# Patient Record
Sex: MALE | Race: BLACK OR AFRICAN AMERICAN | Employment: UNEMPLOYED | URBAN - METROPOLITAN AREA
[De-identification: names, ages, dates, MRNs, and addresses within clinical notes are randomized per-mention and may not be internally consistent; named-entity substitution may affect disease eponyms.]

---
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
## 2020-04-10 ENCOUNTER — HOSPITAL ENCOUNTER (EMERGENCY)
Age: 20
Discharge: HOME | End: 2020-04-10
Attending: EMERGENCY MEDICINE
Payer: SUBSIDIZED

## 2020-04-10 VITALS
HEIGHT: 70 IN | WEIGHT: 278.22 LBS | TEMPERATURE: 98.7 F | RESPIRATION RATE: 18 BRPM | BODY MASS INDEX: 39.83 KG/M2 | DIASTOLIC BLOOD PRESSURE: 95 MMHG | HEART RATE: 81 BPM | OXYGEN SATURATION: 99 % | SYSTOLIC BLOOD PRESSURE: 136 MMHG

## 2020-04-10 DIAGNOSIS — H10.9 BACTERIAL CONJUNCTIVITIS OF RIGHT EYE: Primary | ICD-10-CM

## 2020-04-10 PROCEDURE — 99282 EMERGENCY DEPT VISIT SF MDM: CPT

## 2020-04-10 RX ORDER — CIPROFLOXACIN HYDROCHLORIDE 3.5 MG/ML
1 SOLUTION/ DROPS TOPICAL 2 TIMES DAILY
Qty: 2.5 ML | Refills: 0 | Status: SHIPPED | OUTPATIENT
Start: 2020-04-10 | End: 2020-04-20

## 2020-04-10 NOTE — DISCHARGE INSTRUCTIONS

## 2020-04-10 NOTE — ED PROVIDER NOTES
EMERGENCY DEPARTMENT HISTORY AND PHYSICAL EXAM      Date: 4/10/2020  Patient Name: Umm Ny    History of Presenting Illness     Chief Complaint   Patient presents with   [address]     Patient reports symptoms onset 3 days. History Provided By: Patient    HPI: Umm Ny, 21 y.o. male, presents by POV to the ED with cc of right eye pain, swelling and redness. Patient states he put on false eyelashes and thinks the eyelashes were dirty and caused his eye to become painful and reddened. Patient states he is awakened for the past 3 days with yellowish-greenish eye drainage. Also complains of photophobia, but denies blurred vision or visual changes. There are no other complaints, changes, or physical findings at this time. PCP: None    No current facility-administered medications on file prior to encounter. Current Outpatient Medications on File Prior to Encounter   Medication Sig Dispense Refill    ibuprofen (MOTRIN) 800 mg tablet Take 1 Tab by mouth every eight (8) hours as needed for Pain. 30 Tab 0    butalbital-acetaminophen-caff (FIORICET) -40 mg per capsule Take 1-2 Caps by mouth every four (4) hours as needed for Pain. Max Daily Amount: 12 Caps. Indications: Migraine 20 Cap 0    ondansetron (ZOFRAN ODT) 4 mg disintegrating tablet Take 1 Tab by mouth every eight (8) hours as needed for Nausea. 10 Tab 0       Past History     Past Medical History:  History reviewed. No pertinent past medical history. Past Surgical History:  History reviewed. No pertinent surgical history. Family History:  Family History   Problem Relation Age of Onset    Hypertension Mother     Diabetes Mother     No Known Problems Father        Social History:  Social History     Tobacco Use    Smoking status: Never Smoker    Smokeless tobacco: Never Used   Substance Use Topics    Alcohol use: No    Drug use: No       Allergies:   Allergies   Allergen Reactions    Other Food Hives Mushrooms           Review of Systems   Review of Systems   Constitutional: Negative for activity change, chills and fever. HENT: Negative for congestion, rhinorrhea and sore throat. Eyes: Positive for photophobia, pain, discharge and redness. Negative for itching and visual disturbance. Respiratory: Negative for cough and shortness of breath. Cardiovascular: Negative for chest pain. Gastrointestinal: Negative for abdominal pain, constipation, diarrhea, nausea and vomiting. Endocrine: Negative for polyuria. Genitourinary: Negative for dysuria and frequency. Musculoskeletal: Negative for back pain. Skin: Negative for wound. Neurological: Negative for dizziness and headaches. All other systems reviewed and are negative. Physical Exam   Physical Exam  Vitals signs and nursing note reviewed. Constitutional:       General: He is not in acute distress. Appearance: He is well-developed. He is not diaphoretic. Comments: 21 y.o. male   HENT:      Head: Normocephalic and atraumatic. Nose: Nose normal.      Mouth/Throat:      Mouth: Mucous membranes are moist.      Pharynx: Oropharynx is clear. Eyes:      General: Lids are normal. Vision grossly intact. Gaze aligned appropriately. No visual field deficit. Right eye: Discharge present. No hordeolum. Left eye: No discharge or hordeolum. Extraocular Movements: Extraocular movements intact. Conjunctiva/sclera:      Right eye: Right conjunctiva is injected. No chemosis, exudate or hemorrhage. Left eye: Left conjunctiva is not injected. No chemosis, exudate or hemorrhage. Pupils: Pupils are equal, round, and reactive to light. Neck:      Musculoskeletal: Normal range of motion. Cardiovascular:      Rate and Rhythm: Normal rate and regular rhythm. Heart sounds: Normal heart sounds. No murmur. Pulmonary:      Effort: Pulmonary effort is normal. No respiratory distress.       Breath sounds: Normal breath sounds. No wheezing. Skin:     General: Skin is warm and dry. Neurological:      Mental Status: He is alert and oriented to person, place, and time. Psychiatric:         Behavior: Behavior normal.         Diagnostic Study Results     Labs -   No results found for this or any previous visit (from the past 12 hour(s)). Radiologic Studies -   No orders to display       Medical Decision Making   I am the first provider for this patient. I reviewed the vital signs, available nursing notes, past medical history, past surgical history, family history and social history. Vital Signs-Reviewed the patient's vital signs. Patient Vitals for the past 12 hrs:   Temp Pulse Resp BP SpO2   04/10/20 1547 98.7 °F (37.1 °C) 81 18 (!) 136/95 99 %       Records Reviewed: Nursing Notes, Old Medical Records, Previous Radiology Studies and Previous Laboratory Studies    Provider Notes (Medical Decision Making):   Differential diagnosis include foreign body of the right eye, viral conjunctivitis, bacterial conjunctivitis, allergic rhinitis. ED Course:   Initial assessment performed. The patients presenting problems have been discussed, and they are in agreement with the care plan formulated and outlined with them. I have encouraged them to ask questions as they arise throughout their visit. Patient was given a prescription for ciprofloxacin ophthalmic solution and advised to put 1 drop in both eyes 2 times a day for the next 10 days and to follow-up with ophthalmology or his primary care provider. Advised him if symptoms worsen to come back to the emergency department. Worsening symptoms may include but not limited to increased swelling, drainage, increased pain, visual deficit changes, visual disturbances. Critical Care Time: None    Disposition:  DISCHARGE NOTE:  4:15 PM  The pt is ready for discharge.  The pt's signs, symptoms, diagnosis, and discharge instructions have been discussed and pt has conveyed their understanding. The pt is to follow up as recommended or return to ER should their symptoms worsen. Plan has been discussed and pt is in agreement. Kevin Gonzalez NP  4/10/2020      PLAN:  1. Current Discharge Medication List      START taking these medications    Details   ciprofloxacin HCl (CILOXAN) 0.3 % ophthalmic solution Administer 1 Drop to both eyes two (2) times a day for 10 days. Qty: 2.5 mL, Refills: 0         CONTINUE these medications which have NOT CHANGED    Details   ibuprofen (MOTRIN) 800 mg tablet Take 1 Tab by mouth every eight (8) hours as needed for Pain. Qty: 30 Tab, Refills: 0      butalbital-acetaminophen-caff (FIORICET) -40 mg per capsule Take 1-2 Caps by mouth every four (4) hours as needed for Pain. Max Daily Amount: 12 Caps. Indications: Migraine  Qty: 20 Cap, Refills: 0    Associated Diagnoses: Acute nonintractable headache, unspecified headache type      ondansetron (ZOFRAN ODT) 4 mg disintegrating tablet Take 1 Tab by mouth every eight (8) hours as needed for Nausea. Qty: 10 Tab, Refills: 0           2. Follow-up Information     Follow up With Specialties Details Why Contact Info    [facility] EMERGENCY DEPT Emergency Medicine Go in 1 week As needed, If symptoms worsen [address] [place] [address]    Chavez , MD Ophthalmology Schedule an appointment as soon as possible for a visit in 3 days As needed, If symptoms worsen Janie Negron 11 [phone]          Return to ED if worse     Diagnosis     Clinical Impression:   1. Bacterial conjunctivitis of right eye          Please note that this dictation was completed with [employer], the [employer] voice recognition software. Quite often unanticipated grammatical, syntax, homophones, and other interpretive errors are inadvertently transcribed by the computer software. Please disregards these errors.  Please excuse any errors that have escaped final proofreading. This note will not be viewable in [address]

## 2020-10-11 ENCOUNTER — HOSPITAL ENCOUNTER (EMERGENCY)
Age: 20
Discharge: HOME | End: 2020-10-11
Attending: EMERGENCY MEDICINE
Payer: COMMERCIAL

## 2020-10-11 ENCOUNTER — APPOINTMENT (OUTPATIENT)
Dept: GENERAL RADIOLOGY | Age: 20
End: 2020-10-11
Attending: NURSE PRACTITIONER
Payer: COMMERCIAL

## 2020-10-11 VITALS
OXYGEN SATURATION: 95 % | HEART RATE: 93 BPM | WEIGHT: 260 LBS | HEIGHT: 68 IN | TEMPERATURE: 98.4 F | RESPIRATION RATE: 18 BRPM | SYSTOLIC BLOOD PRESSURE: 183 MMHG | BODY MASS INDEX: 39.4 KG/M2 | DIASTOLIC BLOOD PRESSURE: 80 MMHG

## 2020-10-11 DIAGNOSIS — M25.512 PAIN IN JOINT OF LEFT SHOULDER: Primary | ICD-10-CM
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
## 2020-10-11 PROCEDURE — 96372 THER/PROPH/DIAG INJ SC/IM: CPT

## 2020-10-11 PROCEDURE — 74011250636 HC RX REV CODE- 250/636: Performed by: NURSE PRACTITIONER

## 2020-10-11 PROCEDURE — 99282 EMERGENCY DEPT VISIT SF MDM: CPT

## 2020-10-11 PROCEDURE — 73030 X-RAY EXAM OF SHOULDER: CPT

## 2020-10-11 RX ORDER — NAPROXEN 500 MG/1
500 TABLET ORAL 2 TIMES DAILY WITH MEALS
Qty: 20 TAB | Refills: 0 | Status: SHIPPED | OUTPATIENT
Start: 2020-10-11 | End: 2020-10-21

## 2020-10-11 RX ORDER — METHOCARBAMOL 500 MG/1
500 TABLET, FILM COATED ORAL 4 TIMES DAILY
Qty: 30 TAB | Refills: 0 | Status: SHIPPED | OUTPATIENT
Start: 2020-10-11

## 2020-10-11 RX ORDER — KETOROLAC TROMETHAMINE 30 MG/ML
60 INJECTION, SOLUTION INTRAMUSCULAR; INTRAVENOUS
Status: COMPLETED | OUTPATIENT
Start: 2020-10-11 | End: 2020-10-11

## 2020-10-11 RX ADMIN — KETOROLAC TROMETHAMINE 60 MG: 30 INJECTION, SOLUTION INTRAMUSCULAR; INTRAVENOUS at 15:40
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
## 2020-10-11 NOTE — DISCHARGE INSTRUCTIONS
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
## 2021-04-17 ENCOUNTER — HOSPITAL ENCOUNTER (EMERGENCY)
Age: 21
Discharge: HOME | End: 2021-04-17
Attending: EMERGENCY MEDICINE
Payer: MEDICAID

## 2021-04-17 VITALS
WEIGHT: 263 LBS | TEMPERATURE: 98.6 F | DIASTOLIC BLOOD PRESSURE: 89 MMHG | BODY MASS INDEX: 39.99 KG/M2 | HEART RATE: 94 BPM | SYSTOLIC BLOOD PRESSURE: 151 MMHG | OXYGEN SATURATION: 100 % | RESPIRATION RATE: 16 BRPM

## 2021-04-17 DIAGNOSIS — R11.0 NAUSEA WITHOUT VOMITING: ICD-10-CM

## 2021-04-17 DIAGNOSIS — B34.9 VIRAL ILLNESS: Primary | ICD-10-CM

## 2021-04-17 PROCEDURE — 99282 EMERGENCY DEPT VISIT SF MDM: CPT

## 2021-04-17 RX ORDER — ONDANSETRON 4 MG/1
4 TABLET, ORALLY DISINTEGRATING ORAL
Qty: 20 TAB | Refills: 0 | Status: SHIPPED | OUTPATIENT
Start: 2021-04-17

## 2021-04-17 RX ORDER — ONDANSETRON 4 MG/1
4 TABLET, ORALLY DISINTEGRATING ORAL
Status: DISCONTINUED | OUTPATIENT
Start: 2021-04-17 | End: 2021-04-17 | Stop reason: HOSPADM

## 2021-04-17 NOTE — LETTER
[facility] EMERGENCY DEPT 
[address] [address] 
[phone] Work/School Note Date: 4/17/2021 To Whom It May concern: 
 
Macy Donahue was seen and treated today in the emergency room by the following provider(s): 
Attending Provider: Patric Marie MD 
Nurse Practitioner: Frederic Elizondo NP. Austen Kapoor may return to work on 4/19/21 Sincerely, Silas Zavala RN

## 2021-04-17 NOTE — LETTER
[address] 
[facility] EMERGENCY DEPT 
[address] [address] 
[phone] Work/School Note Date: 4/17/2021 To Whom It May concern: 
 
Nemesio Price was seen and treated today in the emergency room by the following provider(s): 
Attending Provider: Nilson Garvey MD 
Nurse Practitioner: Debby Morgan NP. Austen Kapoor may return to work on 4/20/2021. Sincerely, Aerial LINETTE

## 2021-04-17 NOTE — ED NOTES
Patient here with c/o letter for school/work. Patient states \"let me cut to point, I'm really I just need a doctor's note so I can go home and rest.\"  Patient states that they had recently been exposed to a coworker who was positive for covid-19. Patient states that their work required them to come to the hospital, however patient states that they had been tested outside the hospital and the results came back negative. Patient states that they have had some nausea and vomiting but states \"I don't drink. But I went to [facility] and I had some drinks. I think it's just alcohol poisoning. \"  Patient denies fevers. Patient denies other diet changes. Emergency Department Nursing Plan of Care       The Nursing Plan of Care is developed from the Nursing assessment and Emergency Department Attending provider initial evaluation. The plan of care may be reviewed in the ED Provider note.     The Plan of Care was developed with the following considerations:   Patient / Family readiness to learn indicated by:verbalized understanding  Persons(s) to be included in education: patient  Barriers to Learning/Limitations:No    Signed     Trinity Easley RN    4/17/2021   6:45 PM

## 2021-04-18 NOTE — ED PROVIDER NOTES
EMERGENCY DEPARTMENT HISTORY AND PHYSICAL EXAM    Date: 4/17/2021  Patient Name: Alanis Mendez    History of Presenting Illness     Chief Complaint   Patient presents with    Letter for School/Work     recent covid exposure, test came back negative, states rarely drinks but drank in [facility] so recent vomiting         History Provided By: Patient    Chief Complaint:cough  Duration:  one week  Timing:  Acute  Quality: clear mucous  Severity: Mild  Modifying Factors: none  Associated Symptoms: nasal congestion      HPI: Alanis Mendez is a 24 y.o. male with a PMH of No significant past medical history who presents with request for note to return to work. Patient states he has had a cough and body aches, nasal congestion and had been exposed to a coworker who tested positive for COVID-19 patient states he had a Covid test yesterday and it was negative. Patient states his upper respiratory symptoms are seasonal allergies. Patient states he has been taking Zyrtec and Benadryl at home. Patient states he is ready to go back to work but needs a note. Patient also states he had some vomiting but had a few drinks. Patient states he is still has nausea and has not been eating much but thinks he is afraid to eat due to the nausea. PCP: None    Current Facility-Administered Medications   Medication Dose Route Frequency Provider Last Rate Last Admin    ondansetron (ZOFRAN ODT) tablet 4 mg  4 mg Oral NOW Chaitanya Pelayo NP         Current Outpatient Medications   Medication Sig Dispense Refill    ondansetron (Zofran ODT) 4 mg disintegrating tablet 1 Tab by SubLINGual route every six (6) hours as needed for Nausea or Vomiting. 20 Tab 0    methocarbamoL (Robaxin) 500 mg tablet Take 1 Tab by mouth four (4) times daily. 30 Tab 0    butalbital-acetaminophen-caff (FIORICET) -40 mg per capsule Take 1-2 Caps by mouth every four (4) hours as needed for Pain. Max Daily Amount: 12 Caps.  Indications: Migraine 20 Cap 0       Past History     Past Medical History:  History reviewed. No pertinent past medical history. Past Surgical History:  History reviewed. No pertinent surgical history. Family History:  Family History   Problem Relation Age of Onset    Hypertension Mother     Diabetes Mother     No Known Problems Father        Social History:  Social History     Tobacco Use    Smoking status: Never Smoker    Smokeless tobacco: Never Used   Substance Use Topics    Alcohol use: Yes    Drug use: No       Allergies: Allergies   Allergen Reactions    Other Food Hives     Mushrooms           Review of Systems   Review of Systems   Constitutional: Negative for chills, fatigue and fever. HENT: Positive for congestion and rhinorrhea. Negative for sore throat. Eyes: Negative for redness. Respiratory: Positive for cough. Negative for chest tightness and wheezing. Cardiovascular: Negative for chest pain. Gastrointestinal: Positive for nausea and vomiting. Negative for abdominal pain. Genitourinary: Negative for dysuria. Musculoskeletal: Negative for arthralgias, back pain, myalgias, neck pain and neck stiffness. Skin: Negative for rash. Neurological: Negative for dizziness, syncope, weakness, light-headedness, numbness and headaches. Hematological: Negative for adenopathy. All other systems reviewed and are negative. Physical Exam     Vitals:    04/17/21 1813   BP: (!) 151/89   Pulse: 94   Resp: 16   Temp: 98.6 °F (37 °C)   SpO2: 100%   Weight: 119.3 kg (263 lb)     Physical Exam  Vitals signs and nursing note reviewed. Constitutional:       Appearance: He is well-developed. HENT:      Head: Normocephalic and atraumatic. Right Ear: External ear normal.      Nose: Congestion present. Mouth/Throat:      Pharynx: Posterior oropharyngeal erythema present. Eyes:      General:         Right eye: No discharge. Left eye: No discharge.       Conjunctiva/sclera: Conjunctivae normal.   Neck:      Musculoskeletal: Normal range of motion and neck supple. Cardiovascular:      Rate and Rhythm: Normal rate and regular rhythm. Heart sounds: Normal heart sounds. Pulmonary:      Effort: Pulmonary effort is normal. No respiratory distress. Breath sounds: Normal breath sounds. No wheezing. Abdominal:      General: Bowel sounds are normal.      Palpations: Abdomen is soft. Tenderness: There is no abdominal tenderness. Musculoskeletal: Normal range of motion. Lymphadenopathy:      Cervical: No cervical adenopathy. Skin:     General: Skin is warm and dry. Neurological:      Mental Status: He is alert and oriented to person, place, and time. Cranial Nerves: No cranial nerve deficit. Psychiatric:         Behavior: Behavior normal.         Thought Content: Thought content normal.         Judgment: Judgment normal.           Diagnostic Study Results     Labs -   No results found for this or any previous visit (from the past 12 hour(s)). Radiologic Studies -   No orders to display     CT Results  (Last 48 hours)    None        CXR Results  (Last 48 hours)    None            Medical Decision Making   I am the first provider for this patient. I reviewed the vital signs, available nursing notes, past medical history, past surgical history, family history and social history. Vital Signs-Reviewed the patient's vital signs. Records Reviewed: Nursing Notes    Provider Notes (Medical Decision Making):             Disposition:  home    DISCHARGE NOTE:       I have discussed with patient their diagnosis, treatment, and follow up plan. The patient agrees to follow up as outlined in discharge paperwork and also to return to the ED with any worsening.  Adrián Nuñez NP        Follow-up Information     Follow up With Specialties Details Why [address] Internal Medicine In 1 week  [place] [id number]  [phone]          Discharge Medication List as of 4/17/2021  7:08 PM      CONTINUE these medications which have CHANGED    Details   ondansetron (Zofran ODT) 4 mg disintegrating tablet 1 Tab by SubLINGual route every six (6) hours as needed for Nausea or Vomiting., Normal, Disp-20 Tab, R-0         CONTINUE these medications which have NOT CHANGED    Details   methocarbamoL (Robaxin) 500 mg tablet Take 1 Tab by mouth four (4) times daily. , Normal, Disp-30 Tab,R-0      butalbital-acetaminophen-caff (FIORICET) -40 mg per capsule Take 1-2 Caps by mouth every four (4) hours as needed for Pain. Max Daily Amount: 12 Caps. Indications: Migraine, Print, Disp-20 Cap, R-0             Procedures:  Procedures    Please note that this dictation was completed with Dragon, computer voice recognition software. Quite often unanticipated grammatical, syntax, homophones, and other interpretive errors are inadvertently transcribed by the computer software. Please disregard these errors. Additionally, please excuse any errors that have escaped final proofreading. Diagnosis     Clinical Impression:   1. Viral illness    2.  Nausea without vomiting

## 2021-12-30 ENCOUNTER — HOSPITAL ENCOUNTER (EMERGENCY)
Age: 21
Discharge: LEFT BEFORE DISCHARGE | End: 2021-12-30

## 2021-12-30 VITALS
DIASTOLIC BLOOD PRESSURE: 99 MMHG | RESPIRATION RATE: 18 BRPM | WEIGHT: 250 LBS | OXYGEN SATURATION: 99 % | HEART RATE: 98 BPM | HEIGHT: 67 IN | TEMPERATURE: 99.1 F | SYSTOLIC BLOOD PRESSURE: 181 MMHG | BODY MASS INDEX: 39.24 KG/M2

## 2022-12-27 ENCOUNTER — HOSPITAL ENCOUNTER (EMERGENCY)
Age: 22
Discharge: HOME | End: 2022-12-27
Attending: EMERGENCY MEDICINE
Payer: MEDICAID

## 2022-12-27 VITALS
HEART RATE: 94 BPM | TEMPERATURE: 98 F | BODY MASS INDEX: 39.24 KG/M2 | WEIGHT: 250 LBS | HEIGHT: 67 IN | OXYGEN SATURATION: 99 % | DIASTOLIC BLOOD PRESSURE: 115 MMHG | SYSTOLIC BLOOD PRESSURE: 159 MMHG | RESPIRATION RATE: 18 BRPM

## 2022-12-27 DIAGNOSIS — Z20.2 STD EXPOSURE: Primary | ICD-10-CM

## 2022-12-27 PROCEDURE — 96372 THER/PROPH/DIAG INJ SC/IM: CPT

## 2022-12-27 PROCEDURE — 99284 EMERGENCY DEPT VISIT MOD MDM: CPT

## 2022-12-27 PROCEDURE — 74011000250 HC RX REV CODE- 250: Performed by: EMERGENCY MEDICINE

## 2022-12-27 PROCEDURE — 87491 CHLMYD TRACH DNA AMP PROBE: CPT

## 2022-12-27 PROCEDURE — 74011250636 HC RX REV CODE- 250/636: Performed by: EMERGENCY MEDICINE

## 2022-12-27 RX ORDER — CEFTRIAXONE 250 MG/8ML
500 INJECTION, POWDER, FOR SOLUTION INTRAMUSCULAR; INTRAVENOUS
Status: DISCONTINUED | OUTPATIENT
Start: 2022-12-27 | End: 2022-12-27

## 2022-12-27 RX ORDER — DOXYCYCLINE HYCLATE 100 MG
100 TABLET ORAL 2 TIMES DAILY
Qty: 14 TABLET | Refills: 0 | Status: SHIPPED | OUTPATIENT
Start: 2022-12-27 | End: 2023-01-03

## 2022-12-27 RX ADMIN — LIDOCAINE HYDROCHLORIDE 500 MG: 10 INJECTION, SOLUTION EPIDURAL; INFILTRATION; INTRACAUDAL; PERINEURAL at 18:31

## 2022-12-27 NOTE — ED NOTES
Pt arrives to the ED AAOX4, requesting to be tested and treated for STD after known exposure. Pt denies any symptoms. Pt states he is a transgender female, transitioning from male to female who identifies as she. Pt has his sex organ assigned at birth. Pt is noted in stable condition, now in ED room with side rail up, bed to lowest position, and call light within reach. Will continue to monitor and wait for ED provider evaluation. Emergency Department Nursing Plan of Care       The Nursing Plan of Care is developed from the Nursing assessment and Emergency Department Attending provider initial evaluation. The plan of care may be reviewed in the ED Provider note.     The Plan of Care was developed with the following considerations:   Patient / Family readiness to learn indicated by:verbalized understanding  Persons(s) to be included in education: patient  Barriers to Learning/Limitations:No    Signed     Maria G Marshall    12/27/2022   6:02 PM
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
## 2022-12-27 NOTE — DISCHARGE INSTRUCTIONS
Your STD tests will result within the next 1 to 2 days. We will call you with a positive test.  Otherwise please obtain my chart and you will be able to see results yourself. Take prescribed doxycycline, complete the full 7-day prescription given your exposure.
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
## 2022-12-28 LAB
C TRACH DNA SPEC QL NAA+PROBE: NEGATIVE
N GONORRHOEA DNA SPEC QL NAA+PROBE: NEGATIVE
SAMPLE TYPE: NORMAL
SERVICE CMNT-IMP: NORMAL
SPECIMEN SOURCE: NORMAL

## 2022-12-30 LAB
C TRACH RRNA NPH QL NAA+PROBE: NEGATIVE
N GONORRHOEA RRNA NPH QL NAA+PROBE: NEGATIVE
SPECIMEN SOURCE: NORMAL

## 2023-05-24 RX ORDER — METHOCARBAMOL 500 MG/1
500 TABLET, FILM COATED ORAL 4 TIMES DAILY
COMMUNITY
Start: 2020-10-11

## 2023-05-24 RX ORDER — ONDANSETRON 4 MG/1
4 TABLET, ORALLY DISINTEGRATING ORAL EVERY 6 HOURS PRN
COMMUNITY
Start: 2021-04-17

## 2023-05-24 RX ORDER — BUTALBITAL, ACETAMINOPHEN AND CAFFEINE 300; 40; 50 MG/1; MG/1; MG/1
1-2 CAPSULE ORAL EVERY 4 HOURS PRN
COMMUNITY
Start: 2017-12-29

## 2024-04-18 ENCOUNTER — HOSPITAL ENCOUNTER (EMERGENCY)
Facility: HOSPITAL | Age: 24
Discharge: HOME | End: 2024-04-18

## 2024-04-18 VITALS
HEIGHT: 68 IN | HEART RATE: 98 BPM | BODY MASS INDEX: 47.74 KG/M2 | DIASTOLIC BLOOD PRESSURE: 124 MMHG | OXYGEN SATURATION: 95 % | WEIGHT: 315 LBS | RESPIRATION RATE: 20 BRPM | TEMPERATURE: 98.7 F | SYSTOLIC BLOOD PRESSURE: 173 MMHG

## 2024-04-18 ASSESSMENT — LIFESTYLE VARIABLES
HOW MANY STANDARD DRINKS CONTAINING ALCOHOL DO YOU HAVE ON A TYPICAL DAY: PATIENT DOES NOT DRINK
HOW OFTEN DO YOU HAVE A DRINK CONTAINING ALCOHOL: NEVER

## 2024-04-18 NOTE — ED TRIAGE NOTES
To ED with c/o body aches, fever & chill, associated with emesis, last time she vomited was yesterday morning.  States has not taken any OTC meds.

## 2024-06-17 ENCOUNTER — HOSPITAL ENCOUNTER (OUTPATIENT)
Facility: HOSPITAL | Age: 24
Discharge: HOME | End: 2024-06-20
Attending: FAMILY MEDICINE

## 2024-06-17 ENCOUNTER — TRANSCRIBE ORDERS (OUTPATIENT)
Facility: HOSPITAL | Age: 24
End: 2024-06-17

## 2024-06-17 DIAGNOSIS — M25.572 LEFT ANKLE PAIN, UNSPECIFIED CHRONICITY: Primary | ICD-10-CM

## 2024-06-17 DIAGNOSIS — M25.572 LEFT ANKLE PAIN, UNSPECIFIED CHRONICITY: ICD-10-CM

## 2024-06-17 PROCEDURE — 73610 X-RAY EXAM OF ANKLE: CPT

## 2025-05-24 ENCOUNTER — HOSPITAL ENCOUNTER (EMERGENCY)
Facility: HOSPITAL | Age: 25
Discharge: HOME | End: 2025-05-25
Attending: STUDENT IN AN ORGANIZED HEALTH CARE EDUCATION/TRAINING PROGRAM

## 2025-05-24 ENCOUNTER — APPOINTMENT (OUTPATIENT)
Facility: HOSPITAL | Age: 25
End: 2025-05-24

## 2025-05-24 DIAGNOSIS — R07.9 CHEST PAIN, UNSPECIFIED TYPE: ICD-10-CM

## 2025-05-24 DIAGNOSIS — I16.0 HYPERTENSIVE URGENCY: Primary | ICD-10-CM

## 2025-05-24 DIAGNOSIS — G44.89 OTHER HEADACHE SYNDROME: ICD-10-CM

## 2025-05-24 LAB
BASOPHILS # BLD: 0.04 K/UL (ref 0–0.1)
BASOPHILS NFR BLD: 0.5 % (ref 0–1)
DIFFERENTIAL METHOD BLD: NORMAL
EOSINOPHIL # BLD: 0.09 K/UL (ref 0–0.4)
EOSINOPHIL NFR BLD: 1.1 % (ref 0–7)
ERYTHROCYTE [DISTWIDTH] IN BLOOD BY AUTOMATED COUNT: 13.8 % (ref 11.5–14.5)
HCT VFR BLD AUTO: 39.9 % (ref 36.6–50.3)
HGB BLD-MCNC: 12.8 G/DL (ref 12.1–17)
IMM GRANULOCYTES # BLD AUTO: 0.04 K/UL (ref 0–0.04)
IMM GRANULOCYTES NFR BLD AUTO: 0.5 % (ref 0–0.5)
LYMPHOCYTES # BLD: 2.46 K/UL (ref 0.8–3.5)
LYMPHOCYTES NFR BLD: 30.4 % (ref 12–49)
MCH RBC QN AUTO: 26.3 PG (ref 26–34)
MCHC RBC AUTO-ENTMCNC: 32.1 G/DL (ref 30–36.5)
MCV RBC AUTO: 81.9 FL (ref 80–99)
MONOCYTES # BLD: 0.55 K/UL (ref 0–1)
MONOCYTES NFR BLD: 6.8 % (ref 5–13)
NEUTS SEG # BLD: 4.92 K/UL (ref 1.8–8)
NEUTS SEG NFR BLD: 60.7 % (ref 32–75)
NRBC # BLD: 0 K/UL (ref 0–0.01)
NRBC BLD-RTO: 0 PER 100 WBC
PLATELET # BLD AUTO: 251 K/UL (ref 150–400)
PMV BLD AUTO: 11.7 FL (ref 8.9–12.9)
RBC # BLD AUTO: 4.87 M/UL (ref 4.1–5.7)
WBC # BLD AUTO: 8.1 K/UL (ref 4.1–11.1)

## 2025-05-24 PROCEDURE — 36415 COLL VENOUS BLD VENIPUNCTURE: CPT

## 2025-05-24 PROCEDURE — 70450 CT HEAD/BRAIN W/O DYE: CPT

## 2025-05-24 PROCEDURE — 6370000000 HC RX 637 (ALT 250 FOR IP): Performed by: STUDENT IN AN ORGANIZED HEALTH CARE EDUCATION/TRAINING PROGRAM

## 2025-05-24 PROCEDURE — 99285 EMERGENCY DEPT VISIT HI MDM: CPT

## 2025-05-24 PROCEDURE — 80048 BASIC METABOLIC PNL TOTAL CA: CPT

## 2025-05-24 PROCEDURE — 94762 N-INVAS EAR/PLS OXIMTRY CONT: CPT

## 2025-05-24 PROCEDURE — 85025 COMPLETE CBC W/AUTO DIFF WBC: CPT

## 2025-05-24 PROCEDURE — 93005 ELECTROCARDIOGRAM TRACING: CPT | Performed by: STUDENT IN AN ORGANIZED HEALTH CARE EDUCATION/TRAINING PROGRAM

## 2025-05-24 PROCEDURE — 84484 ASSAY OF TROPONIN QUANT: CPT

## 2025-05-24 RX ORDER — HYDRALAZINE HYDROCHLORIDE 25 MG/1
50 TABLET, FILM COATED ORAL
Status: COMPLETED | OUTPATIENT
Start: 2025-05-24 | End: 2025-05-24

## 2025-05-24 RX ADMIN — HYDRALAZINE HYDROCHLORIDE 50 MG: 25 TABLET ORAL at 23:40

## 2025-05-24 ASSESSMENT — PAIN DESCRIPTION - DESCRIPTORS: DESCRIPTORS: THROBBING

## 2025-05-24 ASSESSMENT — PAIN SCALES - GENERAL: PAINLEVEL_OUTOF10: 10

## 2025-05-24 ASSESSMENT — PAIN DESCRIPTION - LOCATION: LOCATION: HEAD

## 2025-05-24 ASSESSMENT — PAIN - FUNCTIONAL ASSESSMENT: PAIN_FUNCTIONAL_ASSESSMENT: 0-10

## 2025-05-25 ENCOUNTER — APPOINTMENT (OUTPATIENT)
Facility: HOSPITAL | Age: 25
End: 2025-05-25

## 2025-05-25 VITALS
OXYGEN SATURATION: 94 % | RESPIRATION RATE: 25 BRPM | WEIGHT: 315 LBS | BODY MASS INDEX: 47.74 KG/M2 | HEIGHT: 68 IN | DIASTOLIC BLOOD PRESSURE: 107 MMHG | HEART RATE: 98 BPM | SYSTOLIC BLOOD PRESSURE: 192 MMHG | TEMPERATURE: 98.7 F

## 2025-05-25 LAB
ANION GAP SERPL CALC-SCNC: 10 MMOL/L (ref 2–12)
ANION GAP SERPL CALC-SCNC: 11 MMOL/L (ref 2–12)
BUN SERPL-MCNC: 10 MG/DL (ref 6–20)
BUN SERPL-MCNC: 10 MG/DL (ref 6–20)
BUN/CREAT SERPL: 11 (ref 12–20)
BUN/CREAT SERPL: 12 (ref 12–20)
CALCIUM SERPL-MCNC: 8.2 MG/DL (ref 8.5–10.1)
CALCIUM SERPL-MCNC: 8.6 MG/DL (ref 8.5–10.1)
CHLORIDE SERPL-SCNC: 102 MMOL/L (ref 97–108)
CHLORIDE SERPL-SCNC: 102 MMOL/L (ref 97–108)
CO2 SERPL-SCNC: 27 MMOL/L (ref 21–32)
CO2 SERPL-SCNC: 28 MMOL/L (ref 21–32)
CREAT SERPL-MCNC: 0.86 MG/DL (ref 0.7–1.3)
CREAT SERPL-MCNC: 0.87 MG/DL (ref 0.7–1.3)
GLUCOSE SERPL-MCNC: 112 MG/DL (ref 65–100)
GLUCOSE SERPL-MCNC: 118 MG/DL (ref 65–100)
POTASSIUM SERPL-SCNC: 3.4 MMOL/L (ref 3.5–5.1)
POTASSIUM SERPL-SCNC: 3.6 MMOL/L (ref 3.5–5.1)
SODIUM SERPL-SCNC: 140 MMOL/L (ref 136–145)
SODIUM SERPL-SCNC: 140 MMOL/L (ref 136–145)
TROPONIN I SERPL HS-MCNC: 24 NG/L (ref 0–76)
TROPONIN I SERPL HS-MCNC: 25 NG/L (ref 0–76)

## 2025-05-25 PROCEDURE — 71045 X-RAY EXAM CHEST 1 VIEW: CPT

## 2025-05-25 PROCEDURE — 36415 COLL VENOUS BLD VENIPUNCTURE: CPT

## 2025-05-25 PROCEDURE — 80048 BASIC METABOLIC PNL TOTAL CA: CPT

## 2025-05-25 PROCEDURE — 84484 ASSAY OF TROPONIN QUANT: CPT

## 2025-05-25 RX ORDER — HYDROCHLOROTHIAZIDE 25 MG/1
25 TABLET ORAL EVERY MORNING
Qty: 30 TABLET | Refills: 1 | Status: SHIPPED | OUTPATIENT
Start: 2025-05-25

## 2025-05-25 NOTE — ED PROVIDER NOTES
[facility] EMERGENCY DEPT  EMERGENCY DEPARTMENT HISTORY AND PHYSICAL EXAM      Date of evaluation: 5/24/2025  Patient Name: Blair Landin  Birthdate 2000  MRN: [id number]  ED Provider: Jorge Jara MD   Note Started: 2:21 AM EDT 5/25/25    HISTORY OF PRESENT ILLNESS     Chief Complaint   Patient presents with    Hypertension    Headache       History Provided By: Patient    HPI: Blair Landin is a 25 y.o. adult female patient with past medical history of hypertension does not take her blood pressure medication because she does not like the way it makes her feel, occasionally nauseous.  Presenting with high blood pressure, dizziness, headache, chest tightness.  She denies any cardiac history any history of stroke or TIA.  She denies any focal weakness or numbness.    PAST MEDICAL HISTORY   Past Medical History:  No past medical history on file.    Past Surgical History:  No past surgical history on file.    Family History:  Family History   Problem Relation Age of Onset    Diabetes Mother     Hypertension Mother     No Known Problems Father        Social History:  Social History     Tobacco Use    Smoking status: Never    Smokeless tobacco: Never   Substance Use Topics    Alcohol use: Yes    Drug use: No       Allergies:  Allergies   Allergen Reactions    Other/Food Hives     Mushrooms       PCP: No primary care provider on file.    Current Meds:   No current facility-administered medications for this encounter.     Current Outpatient Medications   Medication Sig Dispense Refill    butalbital-APAP-caffeine -40 MG CAPS per capsule Take 1-2 capsules by mouth every 4 hours as needed      methocarbamol (ROBAXIN) 500 MG tablet Take 1 tablet by mouth 4 times daily      ondansetron (ZOFRAN-ODT) 4 MG disintegrating tablet Place 1 tablet under the tongue every 6 hours as needed         Social Determinants of Health:   Social Drivers of Health     Tobacco Use: Low Risk  (12/27/2022)    Received from [employer]

## 2025-05-25 NOTE — DISCHARGE INSTRUCTIONS
If you have any severe him such as headache or chest pain or return of dizziness please return to the ER immediately    Please call your primary care doctor to discuss a blood pressure recheck as soon as possible

## 2025-05-25 NOTE — ED NOTES
Pt presents ambulatory to ED complaining of syncope with associated hypertension. Pt is alert and oriented x 4, RR even and unlabored, skin is warm and dry. Assesment completed and pt updated on plan of care.       Emergency Department Nursing Plan of Care       The Nursing Plan of Care is developed from the Nursing assessment and Emergency Department Attending provider initial evaluation.  The plan of care may be reviewed in the “ED Provider note”.    The Plan of Care was developed with the following considerations:   Patient / Family readiness to learn indicated by:verbalized understanding  Persons(s) to be included in education: patient  Barriers to Learning/Limitations:None    Signed     Emmanuel Howell RN    5/25/2025   2:01 AM

## 2025-05-25 NOTE — ED NOTES
Discharge instructions were given to the patient by EDU Alvares.     The patient left the Emergency Department alert and oriented and in no acute distress with 1 prescription. The patient was encouraged to call or return to the ED for worsening issues or problems and was encouraged to schedule a follow up appointment for continuing care.     Ambulation assessment completed before discharge.  Pt left Emergency Department ambulating at baseline with no ortho devices  Ortho device education: none    The patient verbalized understanding of discharge instructions and prescriptions, all questions were answered. The patient has no further concerns at this time.

## 2025-05-25 NOTE — ED TRIAGE NOTES
Pt reports feeling like she was going to faint at work approximately 45 minutes ago. Pt reports hx of hypertension but is non compliant with her medication. Pt also c/o headache

## 2025-05-26 LAB
EKG ATRIAL RATE: 103 BPM
EKG DIAGNOSIS: NORMAL
EKG P AXIS: 36 DEGREES
EKG P-R INTERVAL: 170 MS
EKG Q-T INTERVAL: 348 MS
EKG QRS DURATION: 100 MS
EKG QTC CALCULATION (BAZETT): 455 MS
EKG R AXIS: -6 DEGREES
EKG T AXIS: 83 DEGREES
EKG VENTRICULAR RATE: 103 BPM